# Patient Record
Sex: MALE | Race: BLACK OR AFRICAN AMERICAN | Employment: FULL TIME | URBAN - METROPOLITAN AREA
[De-identification: names, ages, dates, MRNs, and addresses within clinical notes are randomized per-mention and may not be internally consistent; named-entity substitution may affect disease eponyms.]

---

## 2017-03-13 ENCOUNTER — MYC MEDICAL ADVICE (OUTPATIENT)
Dept: FAMILY MEDICINE | Facility: CLINIC | Age: 32
End: 2017-03-13

## 2017-03-13 ENCOUNTER — TELEPHONE (OUTPATIENT)
Dept: FAMILY MEDICINE | Facility: CLINIC | Age: 32
End: 2017-03-13

## 2017-03-13 RX ORDER — TADALAFIL 20 MG/1
10-20 TABLET ORAL DAILY PRN
Qty: 12 TABLET | Refills: 1 | Status: SHIPPED | OUTPATIENT
Start: 2017-03-13 | End: 2017-08-31

## 2017-03-13 NOTE — TELEPHONE ENCOUNTER
Reason for Call:  Unknown - Requesting to speak with PCP    Detailed comments: Patient requesting to personally speak with Arlene Chahal PA-C . He would not disclose the reason for the call & he would not give any other information.    Phone Number Patient can be reached at: Home number on file [phone] (home)    Best Time: Anytime    Can we leave a detailed message on this number? YES    Call taken on 3/13/2017 at 3:08 PM by Jerrell Lancaster

## 2017-03-13 NOTE — TELEPHONE ENCOUNTER
Patient is requesting to speak with PCP only. Informed him PCP is with pt's. Asked if nurse could take message. Pt declined. States is not emergent. Suggested pt send a my chart message or schedule appt. He declined. Would like to speak to provider only.

## 2017-03-13 NOTE — TELEPHONE ENCOUNTER
Contacted patient, was unable to fill Cialis as no further refills. Discussed I can send in for another #12 + 1 refill, but would like him to schedule an annual exam with me before additional refills given. Discussed would like to see him in clinic once per year, which is why the refills ran out. Patient agrees with plan. Refill sent to patient's pharmacy.

## 2017-08-31 DIAGNOSIS — R37 SEXUAL DYSFUNCTION: Primary | ICD-10-CM

## 2017-08-31 RX ORDER — TADALAFIL 20 MG/1
10-20 TABLET ORAL DAILY PRN
Qty: 12 TABLET | Refills: 1 | Status: SHIPPED | OUTPATIENT
Start: 2017-08-31

## 2017-08-31 NOTE — TELEPHONE ENCOUNTER
Routing refill request to provider for review/approval because:  Elizabeth given x1 and patient did not follow up, please advise

## 2017-08-31 NOTE — TELEPHONE ENCOUNTER
tadalafil (CIALIS) 20 MG tablet      Last Written Prescription Date: 3/13/17  Last Fill Quantity: 12,  # refills: 1   Last Office Visit with [facility], [facility] or [facility] prescribing provider: 2/4/16

## 2020-03-10 ENCOUNTER — HEALTH MAINTENANCE LETTER (OUTPATIENT)
Age: 35
End: 2020-03-10

## 2020-04-24 ENCOUNTER — NURSE TRIAGE (OUTPATIENT)
Dept: NURSING | Facility: CLINIC | Age: 35
End: 2020-04-24

## 2020-04-24 ENCOUNTER — HOSPITAL ENCOUNTER (EMERGENCY)
Facility: CLINIC | Age: 35
Discharge: HOME | End: 2020-04-24
Attending: EMERGENCY MEDICINE | Admitting: EMERGENCY MEDICINE
Payer: COMMERCIAL

## 2020-04-24 VITALS
OXYGEN SATURATION: 98 % | DIASTOLIC BLOOD PRESSURE: 78 MMHG | RESPIRATION RATE: 18 BRPM | SYSTOLIC BLOOD PRESSURE: 123 MMHG | TEMPERATURE: 99.5 F | WEIGHT: 150 LBS | HEIGHT: 65 IN | BODY MASS INDEX: 24.99 KG/M2

## 2020-04-24 DIAGNOSIS — Z20.822 SUSPECTED COVID-19 VIRUS INFECTION: ICD-10-CM

## 2020-04-24 DIAGNOSIS — B34.9 VIRAL SYNDROME: ICD-10-CM

## 2020-04-24 PROCEDURE — 99282 EMERGENCY DEPT VISIT SF MDM: CPT

## 2020-04-24 ASSESSMENT — ENCOUNTER SYMPTOMS
NECK PAIN: 1
CHILLS: 1
WEAKNESS: 1
MYALGIAS: 1
FEVER: 1
BACK PAIN: 1

## 2020-04-24 ASSESSMENT — MIFFLIN-ST. JEOR: SCORE: 1537.28

## 2020-04-24 NOTE — DISCHARGE INSTRUCTIONS
Discharge Instructions  COVID-19    Your Provider has determined that you should practice self-isolation and self-monitoring in order to protect yourself and your community from COVID-19, which is the disease caused by a new coronavirus. The virus spreads from person to person primarily by droplets when an infected person coughs or sneezes and the droplet either lands on another person or that other person touches a surface with the droplet on it. Diagnosis of COVID-19 can be made with a test but many times the test is unavailable or not necessary. There is no specific treatment or medicine for the disease.    Symptoms of COVID-19  Many people have no symptoms or mild symptoms.  Symptoms may usually appear 4 to 5 days (up to 14 days) after contact with another ill person. Some people will get severe symptoms and pneumonia. Usual symptoms are:     Fever    Cough   Trouble breathing   Less common symptoms are: Headache, body aches, sore    throat, sneezing, diarrhea.    How to Care for Yourself    Stay home.  Most people will recover from illness with mild symptoms.  Isolation by staying home is the best method to prevent the spread of the illness. Do not go to work or school. Have a friend or relative do your shopping. Do not use public transportation (bus, train) or ridesharing (Lyft, Uber).    How long should I stay home?  If you have symptoms of a respiratory disease (fever, cough), you should stay home for at least 7 days, and for 3 days with no fever and improvement of respiratory symptoms--whichever is longer. (Your fever should be gone for 3 days without using fever-reducing medicine.)    For example, if you have a fever and coughing for 4 days, you need to stay home 3 more days with no fever for a total of 7 days. Or, if you have a fever and coughing for 5 days, you need to stay home 3 more days with no fever for a total of 8 days.    Separate yourself from other people in your home.?As much as possible, you  should stay in one room and away from other people in your home. Also, use a separate bathroom, if possible. Avoid handling pets or other animals while sick.     Wear a facemask if you need to be around other people and cover your mouth and nose with a tissue when you cough or sneeze.     Avoid sharing personal household items. You should not share dishes, drinking glasses, forks/knives/spoons, towels, or bedding with other people in your home. After using these items, they should be washed with soap and water. Clean parts of your home that are touched often (doorknobs, faucets, countertops, etc.) daily.     Wash your hands often with soap and water for at least 20 seconds or use an alcohol-based hand  containing at least 60% alcohol.     Avoid touching your face.    Treat your symptoms: Take Acetaminophen (Tylenol) to treat body aches and fever as needed for comfort. Ibuprofen (Advil or Motrin) can be used as well if you still have symptoms after taking Tylenol.  Drink fluids. Rest.    Watch for worsening symptoms, shortness of breath, or difficulty   breathing.    Return to the Emergency Department if:    If you are developing worsening breathing, shortness of breath, or feel worse you should seek medical attention.  If you are uncertain, contact your health care provider/clinic. If you need emergency medical attention, call 911 and tell them you have been ill.

## 2020-04-24 NOTE — ED TRIAGE NOTES
States 4 days ago had fever/chills with neck pain and back pain and feeling generalized weakness.  All over body aches today, denies fever.

## 2020-04-24 NOTE — LETTER
April 24, 2020      To Whom It May Concern:      Christian Francis was seen in our Emergency Department today, 04/24/20.  I expect his condition to improve over the next 7 days.  He may return to work/school when improved.    Sincerely,        Jimmy Molina MD

## 2020-04-24 NOTE — ED AVS SNAPSHOT
Emergency Department  [address] [facility] [address]  Phone:  [phone]  Fax:  [phone]                                    Christian Francis   MRN: [id number]    Department:   Emergency Department   Date of Visit:  4/24/2020           After Visit Summary Signature Page    I have received my discharge instructions, and my questions have been answered. I have discussed any challenges I see with this plan with the nurse or doctor.    ..........................................................................................................................................  Patient/Patient Representative Signature      ..........................................................................................................................................  Patient Representative Print Name and Relationship to Patient    ..................................................               ................................................  Date                                   Time    ..........................................................................................................................................  Reviewed by Signature/Title    ...................................................              ..............................................  Date                                               Time          22EPIC Rev 08/18

## 2020-04-24 NOTE — TELEPHONE ENCOUNTER
"Christian is having a fever and then body aches in back.  Fever is gone now.  Now is having pain in mid section of back.  Christian also had chills and fatigue.  Headaches are also present.  Currently denies cough and shortness of breath.  Now back and neck are aching.  Pain is currently a \"7\".  Pain is burning in his back currently.  Christian is stating that his back is now severe and will go to ED.      COVID 19 Nurse Triage Plan/Patient Instructions    Please be aware that novel coronavirus (COVID-19) may be circulating in the community. If you develop symptoms such as fever, cough, or SOB or if you have concerns about the presence of another infection including coronavirus (COVID-19), please contact your health care provider or visit [email or web address].     Disposition/Instructions    Patient to go to ED and follow protocol based instructions. Follow System Ambulatory Workflow for COVID 19.     Bring Your Own Device:  Please also bring your smart device(s) (smart phones, tablets, laptops) and their charging cables for your personal use and to communicate with your care team during your visit.    Thank you for limiting contact with others, wearing a simple mask to cover your cough, practice good hand hygiene habits and accessing our virtual services where possible to limit the spread of this virus.    For more information about COVID19 and options for caring for yourself at home, please visit the CDC website at https://www.cdc.gov/coronavirus/2019-ncov/about/steps-when-sick.html  For more options for care at [facility], please visit our website at [email or web address]    For more information, please use the [place] Department of Health COVID-19 Website: https://www.health.state.[place]/diseases/coronavirus/index.html  [facility] ([facility]) COVID-19 Hotlines (Interpreters available):      Health questions: Phone Number: [phone] or [phone] and Hours: 7 a.m. to 7 " p.m.    Schools and  questions: Phone Number: [phone] or [phone] and Hours 7 a.m. to 7 p.m.                      Reason for Disposition    [1] SEVERE back pain (e.g., excruciating, unable to do any normal activities) AND [2] not improved 2 hours after pain medicine    Additional Information    Negative: Passed out (i.e., lost consciousness, collapsed and was not responding)    Negative: Shock suspected (e.g., cold/pale/clammy skin, too weak to stand, low BP, rapid pulse)    Negative: Sounds like a life-threatening emergency to the triager    Negative: [1] SEVERE back pain (e.g., excruciating) AND [2] sudden onset AND [3] age > 60    Negative: [1] Unable to urinate (or only a few drops) > 4 hours AND     [2] bladder feels very full (e.g., palpable bladder or strong urge to urinate)    Negative: [1] Urinary or bowel incontinence (i.e., loss of bladder or bowel control) AND [2] new onset    Negative: Numbness in groin or rectal area (i.e., loss of sensation)    Negative: [1] SEVERE abdominal pain AND [2] present > 1 hour    Negative: [1] Abdominal pain AND [2] age > 60    Negative: Weakness of a leg or foot (e.g., unable to bear weight, dragging foot)    Negative: Unable to walk    Negative: Patient sounds very sick or weak to the triager    Protocols used: BACK PAIN-A-

## 2020-04-24 NOTE — ED PROVIDER NOTES
"  History     Chief Complaint:    Fever    The history is provided by the patient.      Christian Francis is a 34 year old male who presents with a fever. He reports fevers, chills, neck pain, back pain, and generalized weakness over the last 4 days. Today he developed myalgias, but denies fevers today.  Symptoms started on April 20.  He is been sneezing for 2 days has had body aches as well.  Pain in his back and neck.  Has had a headache.  He has generalized fatigue and weakness.  He is not had any appetite and reports that he is lost his sense of smell.  His fever is now better.  He is concerned that he might have coronavirus so came here for testing.    Allergies:  No Known Drug Allergies     Medications:    Cialis     Past Medical History:    Erectile dysfunction   Anxiety     Past Surgical History:    The patient does not have any pertinent past surgical history.    Family History:    Obesity - mother  Prostate cancer - father  Depression, obesity - sister    Social History:  Negative for tobacco use.  Negative for alcohol use.  Negative for drug use.  Marital Status:   [2]     Review of Systems   Constitutional: Positive for chills and fever (none today).   Musculoskeletal: Positive for back pain, myalgias and neck pain.   Neurological: Positive for weakness.       Physical Exam     Patient Vitals for the past 24 hrs:   BP Temp Temp src Heart Rate Resp SpO2 Height Weight   04/24/20 1424 123/78 99.5  F (37.5  C) Temporal 94 18 98 % 1.651 m (5' 5\") 68 kg (150 lb)     Physical Exam  Eyes:  The pupils are equal and round    Conjunctivae and sclerae are normal  ENT:    The nose is normal    Pinnae are normal  Neck:  Normal range of motion    There is no rigidity noted  CV:  Regular rate and rhythm     No edema  Resp:  Lungs are clear    Non-laboreds   MS:  Normal muscular tone    No asymmetric leg swelling  Skin:  Chronic discoloration of skin of anterior trunk  Neuro:   Awake, alert. Ambulatory in room "     Speech is normal and fluent.    Face is symmetric.     Moves all extremities      Emergency Department Course     Emergency Department Course:  Past medical records, nursing notes, and vitals reviewed.    1450: I performed an exam of the patient as documented above.     I personally reviewed the results with the Patient and answered all related questions prior to discharge.     Findings and plan explained to the Patient. Patient discharged home with instructions regarding supportive care, medications, and reasons to return. The importance of close follow-up was reviewed.     Impression & Plan     Covid-19  Christian Francis was evaluated during a global COVID-19 pandemic, which necessitated consideration that the patient might be at risk for infection with the SARS-CoV-2 virus that causes COVID-19.   Applicable protocols for evaluation were followed during the patient's care.    Medical Decision Making:  Christian Francis is a 34 year old male who presents the emergency department with multiple symptoms.  Symptoms seem consistent with a viral syndrome.  Given the ongoing coronavirus outbreak pandemic suspect this is the likely culprit.  He does work around many people at work.  He is not in healthcare and does not have any underlying medical issues.    At this time he does not meet criteria for testing although I think it is likely that he does have coronavirus.    He was given instructions on home isolation.  He was given instructions on reasons to return as well.  He was discharged home.      Diagnosis:    ICD-10-CM    1. Viral syndrome  B34.9    2. Suspected Covid-19 Virus Infection  Z20.828      Disposition:  Discharged to home.    Discharge Medications:  New Prescriptions    No medications on file     Scribe Disclosure:  I, Meme Kim, am serving as a scribe at 2:52 PM on 4/24/2020 to document services personally performed by Jimmy Molina MD based on my observations and the provider's statements to  me.     Meme Kim  4/24/2020    EMERGENCY DEPARTMENT       Jimmy Molina MD  04/24/20 8188

## 2020-05-08 ENCOUNTER — VIRTUAL VISIT (OUTPATIENT)
Dept: FAMILY MEDICINE | Facility: CLINIC | Age: 35
End: 2020-05-08
Payer: COMMERCIAL

## 2020-05-08 DIAGNOSIS — Z20.822 SUSPECTED COVID-19 VIRUS INFECTION: Primary | ICD-10-CM

## 2020-05-08 PROCEDURE — 99213 OFFICE O/P EST LOW 20 MIN: CPT | Mod: 95 | Performed by: PHYSICIAN ASSISTANT

## 2020-05-08 NOTE — Clinical Note
SUSYI - I tried to get these forms today, but no dice. Looks like you're the form person next wk, all relevant info should be in my note.

## 2020-05-08 NOTE — PROGRESS NOTES
"Christian Francis is a 34 year old male who is being evaluated via a billable telephone visit.      The patient has been notified of following:     \"This telephone visit will be conducted via a call between you and your physician/provider. We have found that certain health care needs can be provided without the need for a physical exam.  This service lets us provide the care you need with a short phone conversation.  If a prescription is necessary we can send it directly to your pharmacy.  If lab work is needed we can place an order for that and you can then stop by our lab to have the test done at a later time.    Telephone visits are billed at different rates depending on your insurance coverage. During this emergency period, for some insurers they may be billed the same as an in-person visit.  Please reach out to your insurance provider with any questions.    If during the course of the call the physician/provider feels a telephone visit is not appropriate, you will not be charged for this service.\"    Patient has given verbal consent for Telephone visit?  Yes    What phone number would you like to be contacted at? [phone]    How would you like to obtain your AVS? Miles    Subjective     Christian Francis is a 34 year old male who presents to clinic today for the following health issues:    HPI  ED/UC Followup:    Facility:  [facility]  Date of visit: 4/24/2020  Reason for visit: fever  Current Status: pt says he is feeling better     - Patient developed fever, body aches, cough, fatigue on 4/20/2020. Body aches got severe on 4/24/2020 so presented to ED.  - Did not meet testing criteria for COVID-19 but this was suspected by ED provider. Patient stable and discharged home with instructions re: quarantine.  - Patient is now symptom-free, last fever was >1 week ago, body aches and generalized weakness resolved approx 3 days ago.  - Due to length of absence, patient has GUERDA paperwork to complete from his " provider. First missed day of work was 4/20/2020 and anticipated RTW 5/14/2020.    Patient Active Problem List   Diagnosis     CARDIOVASCULAR SCREENING; LDL GOAL LESS THAN 160     Low sexual desire disorder     Sexual dysfunction     History reviewed. No pertinent surgical history.    Social History     Tobacco Use     Smoking status: Never Smoker     Smokeless tobacco: Never Used   Substance Use Topics     Alcohol use: No     Alcohol/week: 0.0 standard drinks     Family History   Problem Relation Age of Onset     Obesity Mother      Prostate Cancer Father      Depression Sister      Obesity Sister          Current Outpatient Medications   Medication Sig Dispense Refill     tadalafil (CIALIS) 20 MG tablet Take 0.5-1 tablets (10-20 mg) by mouth daily as needed for erectile dysfunction Never use with nitroglycerin, terazosin or doxazosin. (Patient not taking: Reported on 5/8/2020) 12 tablet 1       Reviewed and updated as needed this visit by Provider         Review of Systems   Constitutional, HEENT, cardiovascular, pulmonary, GI, , musculoskeletal, neuro, skin, endocrine and psych systems are negative, except as otherwise noted.       Objective   PSYCH: Alert and oriented times 3; coherent speech, normal   rate and volume, able to articulate logical thoughts, able   to abstract reason, no tangential thoughts, no hallucinations   or delusions  His affect is normal  RESP: No cough, no audible wheezing, able to talk in full sentences  Remainder of exam unable to be completed due to telephone visits            Assessment/Plan:  1. Suspected Covid-19 Virus Infection  - Patient with suspected COVID-19 infection, has now met all criteria to come out of isolation and is cleared to RTW.  - Patient will either upload forms via My Chart (will need his wife's phone as he doesn't have a smartphone; she is currently residing with her cousin while patient recovered) or drop off at clinic (unable to do today as wife has their  car). I am out of office next week, but ok for one of my partners to complete. Patient with FT work absence from 4/20 - 5/13/2020 due to COVID-19 infection.      No follow-ups on file.      Phone call duration:  11 minutes    Arlene Chahal PA-C

## 2020-12-27 ENCOUNTER — HEALTH MAINTENANCE LETTER (OUTPATIENT)
Age: 35
End: 2020-12-27

## 2021-03-01 ENCOUNTER — TELEPHONE (OUTPATIENT)
Dept: FAMILY MEDICINE | Facility: CLINIC | Age: 36
End: 2021-03-01

## 2021-03-01 NOTE — TELEPHONE ENCOUNTER
Delbertt message sent.    .Nadine GALLEGO    [facility] Kristi Letcher

## 2021-03-03 NOTE — TELEPHONE ENCOUNTER
Patient has read mychart.    .Nadine GALLEGO    [facility] Kristi [place]

## 2021-04-24 ENCOUNTER — HEALTH MAINTENANCE LETTER (OUTPATIENT)
Age: 36
End: 2021-04-24

## 2021-10-04 ENCOUNTER — HEALTH MAINTENANCE LETTER (OUTPATIENT)
Age: 36
End: 2021-10-04

## 2022-05-15 ENCOUNTER — HEALTH MAINTENANCE LETTER (OUTPATIENT)
Age: 37
End: 2022-05-15

## 2022-09-11 ENCOUNTER — HEALTH MAINTENANCE LETTER (OUTPATIENT)
Age: 37
End: 2022-09-11

## 2022-10-04 PROBLEM — F52.0 HYPOACTIVE SEXUAL DESIRE DISORDER: Status: ACTIVE | Noted: 2017-03-13

## 2023-06-03 ENCOUNTER — HEALTH MAINTENANCE LETTER (OUTPATIENT)
Age: 38
End: 2023-06-03